# Patient Record
Sex: MALE | Race: WHITE | Employment: FULL TIME | URBAN - METROPOLITAN AREA
[De-identification: names, ages, dates, MRNs, and addresses within clinical notes are randomized per-mention and may not be internally consistent; named-entity substitution may affect disease eponyms.]

---

## 2019-01-13 ENCOUNTER — OFFICE VISIT (OUTPATIENT)
Dept: URGENT CARE | Facility: URGENT CARE | Age: 29
End: 2019-01-13

## 2019-01-13 ENCOUNTER — NURSE TRIAGE (OUTPATIENT)
Dept: NURSING | Facility: CLINIC | Age: 29
End: 2019-01-13

## 2019-01-13 VITALS
HEIGHT: 69 IN | DIASTOLIC BLOOD PRESSURE: 74 MMHG | HEART RATE: 110 BPM | RESPIRATION RATE: 16 BRPM | OXYGEN SATURATION: 99 % | BODY MASS INDEX: 21.21 KG/M2 | WEIGHT: 143.2 LBS | SYSTOLIC BLOOD PRESSURE: 150 MMHG | TEMPERATURE: 98.9 F

## 2019-01-13 DIAGNOSIS — S61.209A OPEN WOUND OF FINGER, INITIAL ENCOUNTER: Primary | ICD-10-CM

## 2019-01-13 PROCEDURE — 12001 RPR S/N/AX/GEN/TRNK 2.5CM/<: CPT | Performed by: FAMILY MEDICINE

## 2019-01-13 ASSESSMENT — MIFFLIN-ST. JEOR: SCORE: 1609.93

## 2019-01-13 NOTE — TELEPHONE ENCOUNTER
Patient states he cut his hand but he doesn't have insurance and he's not sure he can afford care.    States the cut is deep but there was very little bleeding involved. He has it bandaged at present.    Patient will go to urgent care to inquire about cost of care and if too expensive will ask for recommendations for self treatment.    Protocol and care advice reviewed  Caller states understanding of the recommended disposition. Will go to [facility].    Advised to call back if further questions or concerns        Reason for Disposition    [1] Deep cut AND [2] can see bone or tendons    Protocols used: CUTS AND LACERATIONS-ADULT-

## 2019-01-13 NOTE — NURSING NOTE
"/74 (BP Location: Left arm, Patient Position: Sitting, Cuff Size: Adult Large)   Pulse 110   Temp 98.9  F (37.2  C) (Oral)   Resp 16   Ht 1.753 m (5' 9\")   Wt 65 kg (143 lb 3.2 oz)   SpO2 99%   BMI 21.15 kg/m    Dorothy Ackerman CMA    "

## 2019-01-13 NOTE — PATIENT INSTRUCTIONS
Suture removal in 10-14 days.  Monitor for wound infection.                     Wound Closure and Wound Care  What is wound closure?   Wounds heal more quickly and with less risk of infection and scarring when the wound is cleaned and the wound edges are held together (closed). Scrapes, scratches, puncture wounds, and shallow cuts may need only cleaning, ointment, and a bandage. Some cuts may need to be closed with tape strips called Steri-Strips or tissue adhesive liquid (skin glue). If a cut or surgical incision is deep, very long, jagged, or under a lot of tension (such as a cut over a joint), stitches (also called sutures) or staples may be needed to close the wound.   How do I take care of my wound and sutures?   If you get an accidental cut, put pressure on the wound with a gauze pad or clean cloth right away to stop the bleeding. Then gently but thoroughly wash it with soap and cool water. Soapy water can be used around, but not in the cut. Try to remove all dirt and debris but do not scrub vigorously. If you decide to get medical treatment, cover the wound and apply pressure as needed to control bleeding while traveling to your healthcare provider's office, urgent care clinic, or emergency room.   After a wound is closed by your healthcare provider, the wound and the area around it must be kept clean and dry. The care of a stapled wound is similar to the care of a sutured wound. There are minor differences in caring for a wound closed with skin glue.   Do not let a wound closed with stitches or staples get wet for the first 24 hours. After 24 hours, you can shower or you can clean the wound with hydrogen peroxide or gently wash it with soap and warm water twice a day.   If your wound was closed with skin glue, keep the wound dry for the first 4 hours after the skin glue was put on. After the first 4 hours, you may occasionally and briefly wet the wound in the shower. You can clean the wound with hydrogen  "peroxide or gently wash it with soap and water twice a day.   If your wound is closed with Steri-Strips, they may be more likely to separate if they get wet. Keep them dry for the first few days while you're in the shower or bath.   Do not soak or scrub the wound. Do not take a bath, go swimming, or use a hot tub.   If recommended by your healthcare provider, you may put a small amount of antibiotic ointment on the wound each time you clean it. This can prevent infection. It will also help keep bandages from sticking to the wound. If a rash appears, stop using the ointment. If your wound is closed with skin glue, do not put liquid, antibiotic ointment, or any other product on the wound while the adhesive is in place. It may loosen the film before the wound is healed.   Make sure the wound is kept dry between washings. After showering or bathing, gently pat the wound dry with a soft towel.   Your healthcare provider may recommend that you cover the wound with gauze or a new, bandage to keep it from getting dirty. Be sure to keep the bandage dry. Put on a new bandage after cleaning the wound of if the old one gets dirty or wet.   Your healthcare provider may recommend leaving the wound \"open to air\" and not covered by a bandage while you sleep to help speed up the healing process. If the wound was closed with skin glue, you do not need a bandage.   For the first 1 or 2 days keep the area propped up higher than your heart. This will help lessen your pain and any swelling.   Protect the wound from repeat injury until the skin has had time to heal.   Protect the wound from a lot of exposure to sunlight or tanning lamps while skin glue is in place. Wounds exposed to the sun can become red, while scars that have not been exposed to the sun usually turn white after a period of time.   Do not scratch, rub, or pick at your stitches, staples, or skin glue. This may cause them to loosen before the wound is healed.   Avoid " activities that will make you sweat a lot until the skin glue has naturally fallen off or the stitches or staples have been removed.   Any wound can become infected. When you are cleaning your wound, look for these signs of infection:   increased redness   red streaks   increased swelling   increased pain or tenderness   pus or other drainage   warmth in the area of the wound   fever.   Contact your provider if you see any signs of infection.   If your wound was accidental, be sure to ask if a tetanus booster is needed. Treatment of accidental wounds may include taking an oral antibiotic to help prevent infection. Be sure to take the medicine until it is completely gone. Do not stop taking it just because the wound looks like it is healing well.   When are stitches, staples, or other types of wound closures removed?   Steri-Strips are usually left on until they fall off. If they have not fallen off after 2 weeks, they should be removed. Skin glue usually falls off on its own in 5 to 10 days.   For deep cuts the first stitches are placed under the skin. These stitches are made of materials that dissolve and do not need to be removed. Sutures or staples on the surface of the skin need to be removed by your healthcare provider 5 to 14 days after they are put in. The length of time depends on where the cut is. Sutures in wounds on the face usually can be removed after 5 to 7 days. In areas of high stress, such as hands, knees, or elbows, the sutures must stay in 10 to 14 days. Your provider will tell you when you should come to the office for removal of your sutures or staples. Do NOT remove sutures or staples yourself unless your provider instructs you to do so. Staples are removed using a special tool. If you don't have the tool, don't try to remove the staples.   When should I call my healthcare provider?   Some swelling, redness, and pain are common with all wounds and normally go away as the wound heals.   Call  your provider right away if:   You start to have any signs or symptoms of infection. These include:   Your skin is redder or more painful.   You have red streaks from the wound going toward your heart.   The wound area is very warm to touch.   You have pus or other fluid coming from the wound area.   You have a fever higher than 101.5? F (38.6? C).   You have chills, nausea, vomiting, or muscle aches.   The wound seems to be opening up or you notice any drainage.   The wound bleeds for more than 10 minutes.   The stitches or staples are loose.   The skin glue is loosening before it is supposed to.   You have any symptoms that worry you.     Published by [employer].  This content is reviewed periodically and is subject to change as new health information becomes available. The information is intended to inform and educate and is not a replacement for medical evaluation, advice, diagnosis or treatment by a healthcare professional.   Written by Delroy Wilkinson MD.   ? 2010 [employer] and/or its affiliates. All Rights Reserved.   Copyright   [employer] 2011

## 2019-01-13 NOTE — PROGRESS NOTES
"SUBJECTIVE:     Chief Complaint   Patient presents with     Laceration     Right index finger, pt reports cut is down to the bone     Patient/info Update     Pt does not have insurance, is working on getting coverage, but is worried about incurring high costs in the meantime.      Alec Ricks is a 28 year old male who presents to the clinic with a laceration on the right index finger sustained 5 hour(s) ago.  This is a non-work related and accidental injury.    Mechanism of injury: lucian.    Associated symptoms: Denies numbness, weakness, or loss of function  Last tetanus booster within 10 years: yes    EXAM:   The patient appears today in alert,no apparent distress distress  VITALS: /74 (BP Location: Left arm, Patient Position: Sitting, Cuff Size: Adult Large)   Pulse 110   Temp 98.9  F (37.2  C) (Oral)   Resp 16   Ht 1.753 m (5' 9\")   Wt 65 kg (143 lb 3.2 oz)   SpO2 99%   BMI 21.15 kg/m      Size of laceration: 2.5 centimeters  Characteristics of the laceration: bleeding- mild/moderate, clean and straight  Tendon function intact: yes  Sensation to light touch intact: yes  Pulses intact: yes  Picture included in patient's chart: no    Assessment:  Open wound of finger, initial encounter    PLAN:  PROCEDURE NOTE::  Wound was locally injected with 6 cc's of Lidocaine 2% plain  Good anesthesia was obtained  Prepped and draped in the usual sterile fashion  Wound cleaned with betadine/saline solution  Wound cleaned with Shur-Clens  Laceration was closed using 6 4-0 nylon interrupted sutures  Bleeding vessels were ligate with 6-0 Vicryl running sutures     After care instructions:  Keep wound clean and dry for the next 24-48 hours  Sutures out in 10-14 days  Signs of infection discussed today  Apply anti-bacterial ointment for 3-5 days  May return to work as long as wound is kept clean and dry  Discussed the probability of scarring  Finger splint to wear/david tape fingers    David Houston MD  January 13, " 2019 6:25 PM

## 2019-01-27 ENCOUNTER — OFFICE VISIT (OUTPATIENT)
Dept: URGENT CARE | Facility: URGENT CARE | Age: 29
End: 2019-01-27

## 2019-01-27 VITALS
WEIGHT: 145.8 LBS | HEART RATE: 68 BPM | OXYGEN SATURATION: 96 % | SYSTOLIC BLOOD PRESSURE: 132 MMHG | DIASTOLIC BLOOD PRESSURE: 70 MMHG | BODY MASS INDEX: 21.53 KG/M2 | TEMPERATURE: 97.1 F

## 2019-01-27 DIAGNOSIS — Z48.02 VISIT FOR SUTURE REMOVAL: Primary | ICD-10-CM

## 2019-01-27 PROCEDURE — 99024 POSTOP FOLLOW-UP VISIT: CPT | Performed by: PHYSICIAN ASSISTANT

## 2019-01-27 NOTE — PROGRESS NOTES
Alec Ricks presents to the clinic today for suture removal.    The patient has had the sutures in place for 2 weeks.    There has been no history of infection or drainage.    O: 6 sutures are seen located on the right index finger.  The wound is healing well with no signs of infection.    Tetanus status is up to date.    A: Suture Removal.    P:  All sutures were easily removed today.  Routine wound care  discussed.  The patient will follow up as needed.

## 2019-09-30 ENCOUNTER — HEALTH MAINTENANCE LETTER (OUTPATIENT)
Age: 29
End: 2019-09-30

## 2019-12-20 ENCOUNTER — OFFICE VISIT (OUTPATIENT)
Dept: URGENT CARE | Facility: URGENT CARE | Age: 29
End: 2019-12-20
Payer: MEDICAID

## 2019-12-20 VITALS
SYSTOLIC BLOOD PRESSURE: 180 MMHG | HEART RATE: 100 BPM | DIASTOLIC BLOOD PRESSURE: 80 MMHG | TEMPERATURE: 98.4 F | OXYGEN SATURATION: 99 %

## 2019-12-20 DIAGNOSIS — R03.0 ELEVATED BP WITHOUT DIAGNOSIS OF HYPERTENSION: ICD-10-CM

## 2019-12-20 DIAGNOSIS — R07.89 CHEST WALL PAIN: Primary | ICD-10-CM

## 2019-12-20 PROCEDURE — 99203 OFFICE O/P NEW LOW 30 MIN: CPT | Performed by: PHYSICIAN ASSISTANT

## 2019-12-20 PROCEDURE — 93000 ELECTROCARDIOGRAM COMPLETE: CPT | Performed by: PHYSICIAN ASSISTANT

## 2019-12-20 RX ORDER — CYCLOBENZAPRINE HCL 5 MG
5 TABLET ORAL 3 TIMES DAILY PRN
Qty: 16 TABLET | Refills: 0 | Status: SHIPPED | OUTPATIENT
Start: 2019-12-20 | End: 2021-01-01

## 2019-12-20 ASSESSMENT — ENCOUNTER SYMPTOMS
HEADACHES: 0
CHILLS: 0
NAUSEA: 0
COUGH: 0
FOCAL WEAKNESS: 0
ABDOMINAL PAIN: 0
FEVER: 0
VOMITING: 0
DIARRHEA: 0
SHORTNESS OF BREATH: 0

## 2019-12-20 NOTE — PROGRESS NOTES
HPI  2019    HPI: Alec Ricks is a 29 year old male who complains of moderate L sided chest pain onset 2 months ago. Pain is constant but at times becomes more severe, and has been worse the last few days. Pain becomes worse with movement of L shoulder or certain stretches/movements or when carrying something the wrong way. 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 He has been doing stretches but they don't help. Denies known injury or trauma. Pain is not affected by eating. Symptoms are constant in duration. Denies fever/chills, cough/recent illness, SOB, abd pain, N/V/D, rash, or any other symptoms. Patient denies family hx of CAD, or personal hx of HTN or  HLD. He quit smoking 2 months ago; approx 8 yr pack history.    No past medical history on file.  No past surgical history on file.  Social History     Tobacco Use     Smoking status: Former Smoker     Packs/day: 0.50     Years: 4.00     Pack years: 2.00     Types: Cigarettes     Last attempt to quit: 10/1/2019     Years since quittin.2     Smokeless tobacco: Never Used     Tobacco comment: 7-8 cigarretes per day-started at age 12   Substance Use Topics     Alcohol use: No     Drug use: No     There is no problem list on file for this patient.    Family History   Problem Relation Age of Onset     Coronary Artery Disease No family hx of         Problem list, Medication list, Allergies, and Medical/Social/Surgical histories reviewed in [facility] and updated as appropriate.      Review of Systems   Constitutional: Negative for chills and fever.   Respiratory: Negative for cough and shortness of breath.    Cardiovascular: Positive for chest pain.   Gastrointestinal: Negative for abdominal pain, diarrhea, nausea and vomiting.   Skin: Negative for rash.   Neurological: Negative for focal weakness and headaches.   All other systems reviewed and are negative.        Physical Exam  Vitals signs and nursing note reviewed.    HENT:      Head: Normocephalic and atraumatic.   Cardiovascular:      Rate and Rhythm: Normal rate and regular rhythm.      Heart sounds: Normal heart sounds.   Pulmonary:      Effort: Pulmonary effort is normal.      Breath sounds: Normal breath sounds.   Chest:      Chest wall: Tenderness present.       Musculoskeletal: Normal range of motion.   Skin:     General: Skin is warm and dry.   Neurological:      Mental Status: He is alert and oriented to person, place, and time.       Vital Signs  BP (!) (P) 176/74   Pulse 100   Temp 98.4  F (36.9  C) (Tympanic)   SpO2 99%      Diagnostic Test Results:  none   EKG - NSR, normal axis, slightly short ND interval (118 msec), no acute ST/T changes c/w ischemia, no LVH by voltage criteria, no previous tracings available. Some artifact noted    ASSESSMENT/PLAN      ICD-10-CM    1. Chest wall pain R07.89 EKG 12-lead complete w/read - Clinics     EKG 12-lead complete w/read - Clinics     cyclobenzaprine (FLEXERIL) 5 MG tablet   2. Elevated BP without diagnosis of hypertension R03.0         EKG without acute ST/T changes c/w ischemia or other concerning abnormalities. Lungs CTAB, RRR. CP reproducible on palpation & worse with movements/lifitng- c/w musculoskeletal cause. Suspect injury of pectoral muscle and pt's job where he is active is preventing this from healing. He has 9 days off starting today, suspect this will help. Also Rx Flexeril. Advised ibuprofen and heat as well. Pt declines PT referral.  BP elevated-  F/u with PCP for recheck. Do not suspect cardiac etiology for chest pain.    I have discussed any lab or imaging results, the patient's diagnosis, and my plan of treatment with the patient and/or family. Patient is aware to come back in if with worsening symptoms or if no relief despite treatment plan.  Patient voiced understanding and had no further questions.       Follow Up: Return in about 3 days (around 12/23/2019) for Recheck with primary care  provider.    GARTH Akbar, PAToshiaC  [facility]

## 2019-12-20 NOTE — PATIENT INSTRUCTIONS
Continue ibuprofen.  Rest. Stop the stretches.  Try heat.  Get BP rechecked and a physical with a primary care provider.

## 2021-01-01 ENCOUNTER — OFFICE VISIT (OUTPATIENT)
Dept: URGENT CARE | Facility: URGENT CARE | Age: 31
End: 2021-01-01
Payer: COMMERCIAL

## 2021-01-01 VITALS — SYSTOLIC BLOOD PRESSURE: 154 MMHG | TEMPERATURE: 100.3 F | DIASTOLIC BLOOD PRESSURE: 94 MMHG | HEART RATE: 108 BPM

## 2021-01-01 DIAGNOSIS — S61.412A LACERATION OF LEFT HAND WITHOUT FOREIGN BODY, INITIAL ENCOUNTER: Primary | ICD-10-CM

## 2021-01-01 PROCEDURE — 12001 RPR S/N/AX/GEN/TRNK 2.5CM/<: CPT | Performed by: FAMILY MEDICINE

## 2021-01-01 SDOH — HEALTH STABILITY: MENTAL HEALTH: HOW OFTEN DO YOU HAVE A DRINK CONTAINING ALCOHOL?: NOT ASKED

## 2021-01-01 SDOH — HEALTH STABILITY: MENTAL HEALTH: HOW MANY DRINKS CONTAINING ALCOHOL DO YOU HAVE ON A TYPICAL DAY WHEN YOU ARE DRINKING?: NOT ASKED

## 2021-01-01 SDOH — HEALTH STABILITY: MENTAL HEALTH: HOW OFTEN DO YOU HAVE SIX OR MORE DRINKS ON ONE OCCASION?: NOT ASKED

## 2021-01-01 NOTE — PROGRESS NOTES
SUBJECTIVE:     Chief Complaint   Patient presents with     Urgent Care     Laceration      LT Hand injured while ice fishing bang hand on agar/// red, swollen, 1.5 inch laceration      Alec Ricks is a 30 year old right-handed male who presents to the clinic with a laceration on the left hand sustained today about 2 hours ago.  This is a non-work related, accidental injury.    Mechanism of injury: patient was ice fishing when the back of his left hand banged on an augur.  .    Associated symptoms: Denies numbness, weakness, or loss of function  Last tetanus booster within 10 years: yes.  His last tetanus booster was received on 11/23/2016.      EXAM:   The patient appears today in alert,no apparent distress distress  VITALS: BP (!) 154/94   Pulse 108   Temp 100.3  F (37.9  C) (Tympanic)     Size of laceration: 1.5 centimeters  Characteristics of the laceration: straight and superficial  Tendon function intact: yes  Sensation to light touch intact: not asked  Pulses intact: not asked  Picture included in patient's chart: yes    Assessment:  Laceration of the left hand    PLAN:  PROCEDURE NOTE::  Wound was locally injected with 2 cc's of Lidocaine 1% with epinephrine  Prepped and draped in the usual sterile fashion  Wound cleaned with HIBICLENS  Wound cleaned with betadine/saline solution  Wound cleaned with sterile water  Laceration was closed using three 5-0 nylon interrupted sutures  After care instructions:  Keep wound clean and dry for the next 24-48 hours  Sutures out in 10-11 days  Signs of infection discussed today    Ming Ma MD

## 2021-01-01 NOTE — PATIENT INSTRUCTIONS
Return in 10 to 11 days to have the stitches removed.    Keep the wound clean and dry for the next 24 hours.  Afterwards, you may get the wound wet for brief periods of time; however, avoid prolonged immersion of the wound in water for the next week.      Follow up if any fevers or spreading redness appears.      Patient Education     Extremity Laceration: Stitches, Staples, or Tape  A laceration is a cut through the skin. If it is deep, it may require stitches or staples to close so it can heal. Minor cuts may be treated with surgical tape closures, or skin glue.  X-rays may be done if something may have entered the skin through the cut. You may also need a tetanus shot if you are not up to date on this vaccine.  Home care    Follow the healthcare provider s instructions on how to care for the cut.    Wash your hands with soap and warm water before and after caring for your wound. This is to help prevent infection.    Keep the wound clean and dry. If a bandage was applied and it becomes wet or dirty, replace it. Otherwise, leave it in place for the first 24 hours, then change it once a day or as directed.    If stitches or staples were used, clean the wound daily:  ? After removing the bandage, wash the area with soap and water. Use a wet cotton swab to loosen and remove any blood or crust that forms.  ? After cleaning, keep the wound clean and dry. Talk with your healthcare provider before putting any antibiotic ointment on the wound. Reapply the bandage.    You may remove the bandage to shower as usual after the first 24 hours, but don't soak the area in water (no swimming) until the stitches or staples are removed.    If surgical tape closures were used, keep the area clean and dry. If it becomes wet, blot it dry with a towel. Let the surgical tape fall off on its own.    The healthcare provider may prescribe an antibiotic cream or ointment to prevent infection. He or she may also prescribe an antibiotic pill.  Don't stop taking this medicine until you have finished it all or the provider tells you to stop.    The provider may also prescribe medicine for pain. Follow the instructions for taking these medicines.    Don't do activities that may reopen your wound.  Follow-up care  Follow up with your healthcare provider, or as advised. Most skin wounds heal within 10 days. But an infection may sometimes occur even with proper treatment. Check the wound daily for the signs of infection listed below. Stitches and staples should be removed within 7 to14 days. If surgical tape closures were used, you may remove them after 10 days if they have not fallen off by then.   When to seek medical advice  Call your healthcare provider right away if any of these occur:    Wound bleeding not controlled by direct pressure    Signs of infection, including increasing pain in the wound, increasing wound redness or swelling, or pus or bad odor coming from the wound    Fever of 100.4 F (38 C) or higher, or as directed by your healthcare provider    Stitches or staples come apart or fall out or surgical tape falls off before 7 days    Wound edges reopen    Wound changes colors    Numbness occurs around the wound     Decreased movement around the injured area  Josey last reviewed this educational content on 7/1/2017 2000-2020 [employer]. [address] [place] [address], [place] [address]. All rights reserved. This information is not intended as a substitute for professional medical care. Always follow your healthcare professional's instructions.

## 2021-01-12 ENCOUNTER — OFFICE VISIT (OUTPATIENT)
Dept: URGENT CARE | Facility: URGENT CARE | Age: 31
End: 2021-01-12
Payer: COMMERCIAL

## 2021-01-12 VITALS
HEART RATE: 78 BPM | DIASTOLIC BLOOD PRESSURE: 82 MMHG | SYSTOLIC BLOOD PRESSURE: 140 MMHG | RESPIRATION RATE: 20 BRPM | OXYGEN SATURATION: 98 % | TEMPERATURE: 98 F

## 2021-01-12 DIAGNOSIS — S61.412D LACERATION OF LEFT HAND WITHOUT FOREIGN BODY, SUBSEQUENT ENCOUNTER: Primary | ICD-10-CM

## 2021-01-12 PROCEDURE — 99024 POSTOP FOLLOW-UP VISIT: CPT | Performed by: FAMILY MEDICINE

## 2021-01-12 NOTE — PROGRESS NOTES
Wound appears well-healed.  3 sutures removed without complication.  Bacitracin and bandage applied.  Follow up PRN.

## 2021-01-15 ENCOUNTER — HEALTH MAINTENANCE LETTER (OUTPATIENT)
Age: 31
End: 2021-01-15

## 2021-10-24 ENCOUNTER — HEALTH MAINTENANCE LETTER (OUTPATIENT)
Age: 31
End: 2021-10-24

## 2022-02-13 ENCOUNTER — HEALTH MAINTENANCE LETTER (OUTPATIENT)
Age: 32
End: 2022-02-13

## 2022-10-15 ENCOUNTER — HEALTH MAINTENANCE LETTER (OUTPATIENT)
Age: 32
End: 2022-10-15

## 2023-03-26 ENCOUNTER — HEALTH MAINTENANCE LETTER (OUTPATIENT)
Age: 33
End: 2023-03-26

## 2023-12-02 ENCOUNTER — OFFICE VISIT (OUTPATIENT)
Dept: URGENT CARE | Facility: URGENT CARE | Age: 33
End: 2023-12-02
Payer: COMMERCIAL

## 2023-12-02 VITALS
DIASTOLIC BLOOD PRESSURE: 93 MMHG | TEMPERATURE: 99.1 F | WEIGHT: 162 LBS | OXYGEN SATURATION: 100 % | HEART RATE: 104 BPM | SYSTOLIC BLOOD PRESSURE: 160 MMHG | BODY MASS INDEX: 23.92 KG/M2

## 2023-12-02 DIAGNOSIS — B02.9 HERPES ZOSTER WITHOUT COMPLICATION: Primary | ICD-10-CM

## 2023-12-02 PROCEDURE — 99213 OFFICE O/P EST LOW 20 MIN: CPT | Performed by: PHYSICIAN ASSISTANT

## 2023-12-02 RX ORDER — VALACYCLOVIR HYDROCHLORIDE 1 G/1
1000 TABLET, FILM COATED ORAL 3 TIMES DAILY
Qty: 21 TABLET | Refills: 0 | Status: SHIPPED | OUTPATIENT
Start: 2023-12-02 | End: 2023-12-02

## 2023-12-02 RX ORDER — VALACYCLOVIR HYDROCHLORIDE 1 G/1
1000 TABLET, FILM COATED ORAL 3 TIMES DAILY
Qty: 21 TABLET | Refills: 0 | Status: SHIPPED | OUTPATIENT
Start: 2023-12-02 | End: 2023-12-09

## 2023-12-02 NOTE — PROGRESS NOTES
ASSESSMENT/PLAN:    (B02.9) Herpes zoster without complication  (primary encounter diagnosis)  Plan: valACYclovir (VALTREX) 1000 mg tablet,         DISCONTINUED: valACYclovir (VALTREX) 1000 mg         tablet          The nature of herpes zoster is explained carefully.  Intervention with antiviral agents is extremely helpful early in the course of the disease, less helpful after 2-3 days of symptoms. Postherpetic neuralgia is explained; this may occur despite attempt at prevention. Prescription for valacyclovir (Valtrex), which may shorten the course of acute symptoms and reduce the incidence of later neuralgia.     Home comfort care measures, expected course of illness, expected response to treatment provided here today and red flag signs and sxs all discussed.  Pt verbalizes understanding of and agrees to the above plan.  Educational handout was also provided.    In addition to the above, I discussed patient's elevated blood pressure reading with him today.  It did come down some on second reading.  Patient states he has a history of what sounds like whitecoat hypertension.  I advised he have his blood pressure checked a couple more times outside of clinic and that he follow-up with his primary care team if it remains elevated.  Patient agrees to this.      This progress note has been dictated, with use of voice recognition software. Any grammatical, typographical, or context errors are unintentional and inherent to use of voice recognition software.  -------------    Chief Complaint   Patient presents with    Urgent Care     Rash possible shingles x 2 days     Left sided       SUBJECTIVE:   The patient has a 1 day history of a painful rash (described as burning, prickling, itchy) on the left side.    No past hx of Zoster. Positive hx of chickenpox in childhood. No other systemic sxs.     Despite above acute illness sxs, patient still alert, active, doing all ADL's and taking in PO solids and fluids.  Normal BM's  and urination.     History reviewed. No pertinent past medical history.        Current Outpatient Medications:     NO ACTIVE MEDICATIONS, ., Disp: , Rfl:       Allergies   Allergen Reactions    No Known Drug Allergy          OBJECTIVE:  BP (!) 170/88 (BP Location: Right arm, Patient Position: Sitting, Cuff Size: Adult Regular)   Pulse 104   Temp 99.1  F (37.3  C) (Tympanic)   Wt 73.5 kg (162 lb)   SpO2 100%   BMI 23.92 kg/m      BP (!) 160/93 (BP Location: Right arm, Patient Position: Sitting, Cuff Size: Adult Regular)   Pulse 104   Temp 99.1  F (37.3  C) (Tympanic)   Wt 73.5 kg (162 lb)   SpO2 100%   BMI 23.92 kg/m      GENERAL:  Very pleasant, comfortable and generally well appearing.  SKIN: Typical zoster lesions noted; vesicles on erythematous bases in a left thoracic dermatomal pattern . No evidence of secondary bacterial infection

## 2024-05-26 ENCOUNTER — HEALTH MAINTENANCE LETTER (OUTPATIENT)
Age: 34
End: 2024-05-26

## 2024-09-29 ENCOUNTER — OFFICE VISIT (OUTPATIENT)
Dept: URGENT CARE | Facility: URGENT CARE | Age: 34
End: 2024-09-29
Payer: COMMERCIAL

## 2024-09-29 VITALS
BODY MASS INDEX: 22.43 KG/M2 | WEIGHT: 151.9 LBS | HEART RATE: 100 BPM | TEMPERATURE: 98.1 F | DIASTOLIC BLOOD PRESSURE: 72 MMHG | OXYGEN SATURATION: 100 % | SYSTOLIC BLOOD PRESSURE: 159 MMHG

## 2024-09-29 DIAGNOSIS — R00.2 HEART PALPITATIONS: Primary | ICD-10-CM

## 2024-09-29 DIAGNOSIS — F41.9 ANXIETY: ICD-10-CM

## 2024-09-29 LAB
ALBUMIN SERPL-MCNC: 4 G/DL (ref 3.4–5)
ALP SERPL-CCNC: 76 U/L (ref 40–150)
ALT SERPL W P-5'-P-CCNC: 17 U/L (ref 0–70)
ANION GAP SERPL CALCULATED.3IONS-SCNC: 5 MMOL/L (ref 3–14)
AST SERPL W P-5'-P-CCNC: 22 U/L (ref 0–45)
BASOPHILS # BLD AUTO: 0 10E3/UL (ref 0–0.2)
BASOPHILS NFR BLD AUTO: 0 %
BILIRUB SERPL-MCNC: 0.6 MG/DL (ref 0.2–1.3)
BUN SERPL-MCNC: 14 MG/DL (ref 7–30)
CALCIUM SERPL-MCNC: 10 MG/DL (ref 8.5–10.1)
CHLORIDE BLD-SCNC: 109 MMOL/L (ref 94–109)
CO2 SERPL-SCNC: 28 MMOL/L (ref 20–32)
CREAT SERPL-MCNC: 1 MG/DL (ref 0.66–1.25)
EGFRCR SERPLBLD CKD-EPI 2021: >90 ML/MIN/1.73M2
EOSINOPHIL # BLD AUTO: 0.1 10E3/UL (ref 0–0.7)
EOSINOPHIL NFR BLD AUTO: 1 %
ERYTHROCYTE [DISTWIDTH] IN BLOOD BY AUTOMATED COUNT: 12.2 % (ref 10–15)
GLUCOSE BLD-MCNC: 139 MG/DL (ref 70–99)
HCT VFR BLD AUTO: 49.4 % (ref 40–53)
HGB BLD-MCNC: 16.7 G/DL (ref 13.3–17.7)
IMM GRANULOCYTES # BLD: 0 10E3/UL
IMM GRANULOCYTES NFR BLD: 0 %
LYMPHOCYTES # BLD AUTO: 1.1 10E3/UL (ref 0.8–5.3)
LYMPHOCYTES NFR BLD AUTO: 19 %
MAGNESIUM SERPL-MCNC: 1.9 MG/DL (ref 1.7–2.3)
MCH RBC QN AUTO: 30.3 PG (ref 26.5–33)
MCHC RBC AUTO-ENTMCNC: 33.8 G/DL (ref 31.5–36.5)
MCV RBC AUTO: 90 FL (ref 78–100)
MONOCYTES # BLD AUTO: 0.6 10E3/UL (ref 0–1.3)
MONOCYTES NFR BLD AUTO: 11 %
NEUTROPHILS # BLD AUTO: 3.9 10E3/UL (ref 1.6–8.3)
NEUTROPHILS NFR BLD AUTO: 68 %
PLATELET # BLD AUTO: 220 10E3/UL (ref 150–450)
POTASSIUM BLD-SCNC: 4.4 MMOL/L (ref 3.4–5.3)
PROT SERPL-MCNC: 7.1 G/DL (ref 6.8–8.8)
RBC # BLD AUTO: 5.51 10E6/UL (ref 4.4–5.9)
SODIUM SERPL-SCNC: 142 MMOL/L (ref 135–145)
TSH SERPL DL<=0.005 MIU/L-ACNC: 0.8 UIU/ML (ref 0.3–4.2)
WBC # BLD AUTO: 5.7 10E3/UL (ref 4–11)

## 2024-09-29 PROCEDURE — 83735 ASSAY OF MAGNESIUM: CPT | Performed by: FAMILY MEDICINE

## 2024-09-29 PROCEDURE — 36415 COLL VENOUS BLD VENIPUNCTURE: CPT | Performed by: FAMILY MEDICINE

## 2024-09-29 PROCEDURE — 80053 COMPREHEN METABOLIC PANEL: CPT | Performed by: FAMILY MEDICINE

## 2024-09-29 PROCEDURE — 99214 OFFICE O/P EST MOD 30 MIN: CPT | Performed by: FAMILY MEDICINE

## 2024-09-29 PROCEDURE — 85025 COMPLETE CBC W/AUTO DIFF WBC: CPT | Performed by: FAMILY MEDICINE

## 2024-09-29 PROCEDURE — 84443 ASSAY THYROID STIM HORMONE: CPT | Performed by: FAMILY MEDICINE

## 2024-09-29 PROCEDURE — 93000 ELECTROCARDIOGRAM COMPLETE: CPT | Performed by: FAMILY MEDICINE

## 2024-09-29 RX ORDER — DIPHENHYDRAMINE HCL 25 MG
25 TABLET ORAL EVERY 6 HOURS PRN
COMMUNITY

## 2024-09-29 RX ORDER — HYDROXYZINE HYDROCHLORIDE 25 MG/1
25 TABLET, FILM COATED ORAL EVERY 8 HOURS PRN
Qty: 20 TABLET | Refills: 0 | Status: SHIPPED | OUTPATIENT
Start: 2024-09-29

## 2024-09-29 NOTE — PROGRESS NOTES
SUBJECTIVE:  Chief Complaint   Patient presents with    HEAVY HEART FEELING     ONSET LAST NIGHT     Alec Ricks is a 34 year old male who presents with a chief complaint of heart feeling heavy last night.    Was going to sleep when notice heart feeling heavy, felt that his heart was beating harder.  Took about an hour and then but was able to fall asleep.    Okay when woke up but when started to notice similar symptoms again when started walking around.  Feeling a little better while waiting in .    Has underlying anxiety, not on any medication  Denies any recent URI symptoms    No past medical history on file.  Current Outpatient Medications   Medication Sig Dispense Refill    diphenhydrAMINE (BENADRYL) 25 MG tablet Take 25 mg by mouth every 6 hours as needed for itching or allergies.      NO ACTIVE MEDICATIONS .      valACYclovir (VALTREX) 1000 mg tablet Take 1 tablet (1,000 mg) by mouth 3 times daily for 7 days 21 tablet 0     Social History     Tobacco Use    Smoking status: Some Days     Current packs/day: 0.00     Average packs/day: 0.5 packs/day for 4.0 years (2.0 ttl pk-yrs)     Types: Cigarettes     Start date: 10/1/2015     Last attempt to quit: 10/1/2019     Years since quittin.0    Smokeless tobacco: Never    Tobacco comments:     7-8 cigarretes per day-started at age 12   Substance Use Topics    Alcohol use: Yes       ROS:  Review of systems negative except as stated above.    EXAM:   BP (!) 159/72   Pulse 100   Temp 98.1  F (36.7  C)   Wt 68.9 kg (151 lb 14.4 oz)   SpO2 100%   BMI 22.43 kg/m    GENERAL APPEARANCE: healthy, alert and no distress  CHEST: no audible wheezes or increase work of breathing  PSYCH:alert, affect flat    EKG - NSR, rate 80, no ST c/w ischemia    Results for orders placed or performed in visit on 24   Comprehensive metabolic panel     Status: Abnormal   Result Value Ref Range    Sodium 142 135 - 145 mmol/L    Potassium 4.4 3.4 - 5.3 mmol/L    Chloride 109 94  - 109 mmol/L    Carbon Dioxide (CO2) 28 20 - 32 mmol/L    Anion Gap 5 3 - 14 mmol/L    Urea Nitrogen 14 7 - 30 mg/dL    Creatinine 1.00 0.66 - 1.25 mg/dL    GFR Estimate >90 >60 mL/min/1.73m2    Calcium 10.0 8.5 - 10.1 mg/dL    Glucose 139 (H) 70 - 99 mg/dL    Alkaline Phosphatase 76 40 - 150 U/L    AST 22 0 - 45 U/L    ALT 17 0 - 70 U/L    Protein Total 7.1 6.8 - 8.8 g/dL    Albumin 4.0 3.4 - 5.0 g/dL    Bilirubin Total 0.6 0.2 - 1.3 mg/dL   CBC with platelets and differential     Status: None   Result Value Ref Range    WBC Count 5.7 4.0 - 11.0 10e3/uL    RBC Count 5.51 4.40 - 5.90 10e6/uL    Hemoglobin 16.7 13.3 - 17.7 g/dL    Hematocrit 49.4 40.0 - 53.0 %    MCV 90 78 - 100 fL    MCH 30.3 26.5 - 33.0 pg    MCHC 33.8 31.5 - 36.5 g/dL    RDW 12.2 10.0 - 15.0 %    Platelet Count 220 150 - 450 10e3/uL    % Neutrophils 68 %    % Lymphocytes 19 %    % Monocytes 11 %    % Eosinophils 1 %    % Basophils 0 %    % Immature Granulocytes 0 %    Absolute Neutrophils 3.9 1.6 - 8.3 10e3/uL    Absolute Lymphocytes 1.1 0.8 - 5.3 10e3/uL    Absolute Monocytes 0.6 0.0 - 1.3 10e3/uL    Absolute Eosinophils 0.1 0.0 - 0.7 10e3/uL    Absolute Basophils 0.0 0.0 - 0.2 10e3/uL    Absolute Immature Granulocytes 0.0 <=0.4 10e3/uL   CBC with platelets and differential     Status: None    Narrative    The following orders were created for panel order CBC with platelets and differential.  Procedure                               Abnormality         Status                     ---------                               -----------         ------                     CBC with platelets and d...[[id number]]                      Final result                 Please view results for these tests on the individual orders.         ASSESSMENT/PLAN:  (R00.2) Heart palpitations  (primary encounter diagnosis)  Plan: EKG 12-lead complete w/read - Clinics, CBC with        platelets and differential, Comprehensive         metabolic panel, TSH with free T4 reflex,          Magnesium            (F41.9) Anxiety  Plan: hydrOXYzine HCl (ATARAX) 25 MG tablet            Reassurance given, patient is not in acute distress and reviewed initial labs and EKG unremarkable.  Will follow up on pending labs and notify if any abnormalities.  Discussed that will require further evaluation for cardiac etiology, this will need to be thru primary provider for consideration of heart monitor.    Discussed anxiety and that this can cause symptoms of heart palpitations and paresthesia symptoms, trial of RX hydroxyzine given to help with anxiety.    Follow up with primary provider in 1-2 weeks    David Houston MD  September 29, 2024 1:29 PM

## 2024-09-29 NOTE — PATIENT INSTRUCTIONS
Please follow up with primary provider for further evaluation, may need to obtain heart monitor    We will contact you if pending labs are not normal    Okay to take hydroxyzine to help with anxiety that may be contributing to symptoms

## 2025-06-14 ENCOUNTER — HEALTH MAINTENANCE LETTER (OUTPATIENT)
Age: 35
End: 2025-06-14